# Patient Record
Sex: FEMALE | Race: WHITE | NOT HISPANIC OR LATINO | URBAN - NONMETROPOLITAN AREA
[De-identification: names, ages, dates, MRNs, and addresses within clinical notes are randomized per-mention and may not be internally consistent; named-entity substitution may affect disease eponyms.]

---

## 2024-03-11 ENCOUNTER — LAB (OUTPATIENT)
Dept: URBAN - NONMETROPOLITAN AREA CLINIC 2 | Facility: CLINIC | Age: 24
End: 2024-03-11

## 2024-03-11 ENCOUNTER — OV NP (OUTPATIENT)
Dept: URBAN - NONMETROPOLITAN AREA CLINIC 2 | Facility: CLINIC | Age: 24
End: 2024-03-11
Payer: COMMERCIAL

## 2024-03-11 VITALS
HEIGHT: 67 IN | DIASTOLIC BLOOD PRESSURE: 77 MMHG | HEART RATE: 98 BPM | BODY MASS INDEX: 23.54 KG/M2 | SYSTOLIC BLOOD PRESSURE: 111 MMHG | WEIGHT: 150 LBS

## 2024-03-11 DIAGNOSIS — E73.8 ACQUIRED LACTOSE INTOLERANCE: ICD-10-CM

## 2024-03-11 DIAGNOSIS — K59.09 CHRONIC CONSTIPATION: ICD-10-CM

## 2024-03-11 DIAGNOSIS — K92.1 BLOOD IN STOOL: ICD-10-CM

## 2024-03-11 PROBLEM — 782415009: Status: ACTIVE | Noted: 2024-03-11

## 2024-03-11 PROCEDURE — 99203 OFFICE O/P NEW LOW 30 MIN: CPT | Performed by: NURSE PRACTITIONER

## 2024-03-11 NOTE — HPI-TODAY'S VISIT:
Radha is a 20-year-old female who presents today for rectal bleeding.  She reports that as a baby she was found to be allergic to dairy after she had bright red blood in her diaper.  She now is lactose intolerant and tries to avoid it when she can but also eats that with lactose pills if she eats a significant amount.  She reports lifelong constipation, stating she has a bowel movement every 3 days.  No abdominal pain, rectal pain, or difficulty fully evacuating.  In January she noticed bright red blood when wiping.  She had no pain when passing her stools.  She presented to her primary care provider who recommended suppositories which did not resolve the blood entirely, though she admits she only used 2.  Her weight has been stable.  She denies any family history of colon cancer.  Denies any mucus in her stools as well as nocturnal bowel movements.  Bleeding has not recurred.  Normal menstrual cycles.  No labs drawn and would like to avoid due to her significant fear of needles.  Instead, she would like to proceed with colonoscopy to rule out CRC, given close family friend with similar symptoms. LG.

## 2024-03-28 ENCOUNTER — COLON (OUTPATIENT)
Dept: URBAN - NONMETROPOLITAN AREA SURGERY CENTER 1 | Facility: SURGERY CENTER | Age: 24
End: 2024-03-28

## 2024-06-03 ENCOUNTER — OFFICE VISIT (OUTPATIENT)
Dept: URBAN - NONMETROPOLITAN AREA CLINIC 2 | Facility: CLINIC | Age: 24
End: 2024-06-03